# Patient Record
Sex: FEMALE | Race: WHITE | Employment: OTHER | URBAN - NONMETROPOLITAN AREA
[De-identification: names, ages, dates, MRNs, and addresses within clinical notes are randomized per-mention and may not be internally consistent; named-entity substitution may affect disease eponyms.]

---

## 2018-03-16 ENCOUNTER — HOSPITAL ENCOUNTER (OUTPATIENT)
Dept: CT IMAGING | Age: 73
Discharge: HOME | End: 2018-03-16
Payer: MEDICARE

## 2018-03-16 DIAGNOSIS — R10.32 ABDOMINAL PAIN, LEFT LOWER QUADRANT: ICD-10-CM

## 2018-03-16 LAB
CREAT SERPL-MCNC: 1.1 MG/DL (ref 0.5–1.2)
GFR, ESTIMATED: 52 ML/MIN/1.73M2

## 2018-03-16 PROCEDURE — 6360000004 HC RX CONTRAST MEDICATION: Performed by: FAMILY MEDICINE

## 2018-03-16 PROCEDURE — 82565 ASSAY OF CREATININE: CPT

## 2018-03-16 PROCEDURE — 74177 CT ABD & PELVIS W/CONTRAST: CPT

## 2018-03-16 RX ADMIN — IOHEXOL 50 ML: 240 INJECTION, SOLUTION INTRATHECAL; INTRAVASCULAR; INTRAVENOUS; ORAL at 11:08

## 2018-03-16 RX ADMIN — IOPAMIDOL 85 ML: 755 INJECTION, SOLUTION INTRAVENOUS at 11:08

## 2021-01-12 ENCOUNTER — HOSPITAL ENCOUNTER (OUTPATIENT)
Dept: CT IMAGING | Age: 76
Discharge: HOME | End: 2021-01-12
Payer: MEDICARE

## 2021-01-12 DIAGNOSIS — R10.84 ABDOMINAL PAIN, GENERALIZED: ICD-10-CM

## 2021-01-12 LAB
CREAT SERPL-MCNC: 0.9 MG/DL (ref 0.5–1.2)
GFR, ESTIMATED: 65 ML/MIN/1.73M2

## 2021-01-12 PROCEDURE — 74177 CT ABD & PELVIS W/CONTRAST: CPT

## 2021-01-12 PROCEDURE — 82565 ASSAY OF CREATININE: CPT

## 2021-01-12 PROCEDURE — 6360000004 HC RX CONTRAST MEDICATION: Performed by: FAMILY MEDICINE

## 2021-01-12 RX ADMIN — IOPAMIDOL 85 ML: 755 INJECTION, SOLUTION INTRAVENOUS at 12:46

## 2021-01-12 RX ADMIN — IOHEXOL 50 ML: 240 INJECTION, SOLUTION INTRATHECAL; INTRAVASCULAR; INTRAVENOUS; ORAL at 12:46

## 2021-02-18 ENCOUNTER — HOSPITAL ENCOUNTER (OUTPATIENT)
Dept: INTERVENTIONAL RADIOLOGY/VASCULAR | Age: 76
Discharge: HOME | End: 2021-02-18
Payer: MEDICARE

## 2021-02-18 DIAGNOSIS — R22.41 LOCALIZED SWELLING OF RIGHT LOWER LEG: ICD-10-CM

## 2021-02-18 PROCEDURE — 93971 EXTREMITY STUDY: CPT

## 2021-10-26 ENCOUNTER — HOSPITAL ENCOUNTER (OUTPATIENT)
Dept: INTERVENTIONAL RADIOLOGY/VASCULAR | Age: 76
Discharge: HOME | End: 2021-10-26
Payer: MEDICARE

## 2021-10-26 DIAGNOSIS — R22.41 LOCALIZED SWELLING, MASS, OR LUMP OF LOWER EXTREMITY, RIGHT: ICD-10-CM

## 2021-10-26 PROCEDURE — 93971 EXTREMITY STUDY: CPT

## 2025-01-27 ENCOUNTER — HOSPITAL ENCOUNTER (OUTPATIENT)
Dept: GENERAL RADIOLOGY | Age: 80
Discharge: HOME | End: 2025-01-27
Payer: MEDICARE

## 2025-01-27 ENCOUNTER — HOSPITAL ENCOUNTER (OUTPATIENT)
Age: 80
Discharge: HOME | End: 2025-01-27
Payer: MEDICARE

## 2025-01-27 DIAGNOSIS — M25.551 RIGHT HIP PAIN: ICD-10-CM

## 2025-01-27 DIAGNOSIS — M25.561 ARTHRALGIA OF KNEE, RIGHT: ICD-10-CM

## 2025-01-27 PROCEDURE — 73564 X-RAY EXAM KNEE 4 OR MORE: CPT

## 2025-01-27 PROCEDURE — 73502 X-RAY EXAM HIP UNI 2-3 VIEWS: CPT

## 2025-02-05 ENCOUNTER — HOSPITAL ENCOUNTER (OUTPATIENT)
Dept: PHYSICAL THERAPY | Age: 80
Setting detail: THERAPIES SERIES
Discharge: HOME | End: 2025-02-05
Payer: MEDICARE

## 2025-02-05 PROCEDURE — 97110 THERAPEUTIC EXERCISES: CPT

## 2025-02-05 PROCEDURE — 97161 PT EVAL LOW COMPLEX 20 MIN: CPT

## 2025-02-05 NOTE — PROGRESS NOTES
[facility]  PHYSICAL THERAPY  [x] EVALUATION  [] DAILY NOTE (LAND) [] DAILY NOTE (AQUATIC ) [] PROGRESS NOTE [] DISCHARGE NOTE    [] [facility] [place]   [x] [place] [facility]    [] [facility]   [] [facility]    Date: 2025  Patient Name:  Miesha Ambrocio  : 1945  MRN: [id number]  CSN: [id number]    Referring Practitioner Lavelle Tomlin MD [id number]      Diagnosis  Diagnoses       M70.61 (ICD-10-CM) - Trochanteric bursitis, right hip           Treatment Diagnosis M25.551  Right Hip Pain  R53.1 Weakness  M62.81 Generalized Muscle Weakness  R26.2 Difficulty in walking   Date of Evaluation 25   Additional Pertinent History Miesha Ambrocio has a past medical history of Diverticulitis, Hyperlipidemia, Hypertension, and Thyroid disease.  she has a past surgical history that includes Hysterectomy; Appendectomy; shoulder surgery; Bunionectomy; and Dilation and curettage of uterus.     Allergies No Known Allergies   Medications   Current Outpatient Medications:     doxycycline (VIBRA-TABS) 100 MG tablet, Take 1 tablet by mouth 2 times daily., Disp: 20 tablet, Rfl: 0    simvastatin (ZOCOR) 20 MG tablet, Take 20 mg by mouth nightly., Disp: , Rfl:     levocetirizine (XYZAL) 5 MG tablet, Take 5 mg by mouth nightly., Disp: , Rfl:     levothyroxine (SYNTHROID) 50 MCG tablet, Take 50 mcg by mouth Daily., Disp: , Rfl:     losartan (COZAAR) 50 MG tablet, Take 50 mg by mouth daily., Disp: , Rfl:     warfarin (COUMADIN) 2 MG tablet, Take 2 mg by mouth Daily. Take 3 on Sat, Sun, Tues and Thurs and 2.5 on Mon, Wed and Fri., Disp: , Rfl:       Functional Outcome Measure Used HOOS Jr   score 18 (25)       Insurance: Primary: Payor: [employer] /  /  / ,   Secondary:    Authorization Information DEDUCTIBLE: $NA               OUT OF POCKET: $2000 MET $13.93              INSURANCE PAYS AT:  95% of allowable              PATIENT

## 2025-02-10 ENCOUNTER — HOSPITAL ENCOUNTER (OUTPATIENT)
Dept: PHYSICAL THERAPY | Age: 80
Setting detail: THERAPIES SERIES
Discharge: HOME | End: 2025-02-10
Payer: MEDICARE

## 2025-02-10 PROCEDURE — 97035 APP MDLTY 1+ULTRASOUND EA 15: CPT

## 2025-02-10 PROCEDURE — 97110 THERAPEUTIC EXERCISES: CPT

## 2025-02-10 NOTE — PROGRESS NOTES
[facility]  PHYSICAL THERAPY  [] EVALUATION  [x] DAILY NOTE (LAND) [] DAILY NOTE (AQUATIC ) [] PROGRESS NOTE [] DISCHARGE NOTE    [] [facility] [place]   [x] [place] [facility]    [] [facility]   [] [facility]    Date: 2/10/2025  Patient Name:  Miesha Ambrocio  : 1945  MRN: [id number]  CSN: [id number]    Referring Practitioner Lavelle Tomlin MD [id number]      Diagnosis  Diagnoses       M70.61 (ICD-10-CM) - Trochanteric bursitis, right hip           Treatment Diagnosis M25.551  Right Hip Pain  R53.1 Weakness  M62.81 Generalized Muscle Weakness  R26.2 Difficulty in walking   Date of Evaluation 25   Additional Pertinent History Miesha Ambrocio has a past medical history of Diverticulitis, Hyperlipidemia, Hypertension, and Thyroid disease.  she has a past surgical history that includes Hysterectomy; Appendectomy; shoulder surgery; Bunionectomy; and Dilation and curettage of uterus.     Allergies No Known Allergies   Medications   Current Outpatient Medications:     doxycycline (VIBRA-TABS) 100 MG tablet, Take 1 tablet by mouth 2 times daily., Disp: 20 tablet, Rfl: 0    simvastatin (ZOCOR) 20 MG tablet, Take 20 mg by mouth nightly., Disp: , Rfl:     levocetirizine (XYZAL) 5 MG tablet, Take 5 mg by mouth nightly., Disp: , Rfl:     levothyroxine (SYNTHROID) 50 MCG tablet, Take 50 mcg by mouth Daily., Disp: , Rfl:     losartan (COZAAR) 50 MG tablet, Take 50 mg by mouth daily., Disp: , Rfl:     warfarin (COUMADIN) 2 MG tablet, Take 2 mg by mouth Daily. Take 3 on Sat, Sun, Tues and Thurs and 2.5 on Mon, Wed and Fri., Disp: , Rfl:       Functional Outcome Measure Used HOOS Jr   score 18 (25)       Insurance: Primary: Payor: [employer] /  /  / ,   Secondary:    Authorization Information DEDUCTIBLE: $NA               OUT OF POCKET: $2000 MET $13.93              INSURANCE PAYS AT:  95% of allowable              PATIENT

## 2025-02-12 ENCOUNTER — HOSPITAL ENCOUNTER (OUTPATIENT)
Dept: PHYSICAL THERAPY | Age: 80
Setting detail: THERAPIES SERIES
Discharge: HOME | End: 2025-02-12
Payer: MEDICARE

## 2025-02-12 PROCEDURE — 97035 APP MDLTY 1+ULTRASOUND EA 15: CPT

## 2025-02-12 PROCEDURE — 97110 THERAPEUTIC EXERCISES: CPT

## 2025-02-12 NOTE — PROGRESS NOTES
[facility]  PHYSICAL THERAPY  [] EVALUATION  [x] DAILY NOTE (LAND) [] DAILY NOTE (AQUATIC ) [] PROGRESS NOTE [] DISCHARGE NOTE    [] [facility] [place]   [x] [place] [facility]    [] [facility]   [] [facility]    Date: 2025  Patient Name:  Miesha Ambrocio  : 1945  MRN: [id number]  CSN: [id number]    Referring Practitioner Lavelle Tomlin MD [id number]      Diagnosis  Diagnoses       M70.61 (ICD-10-CM) - Trochanteric bursitis, right hip           Treatment Diagnosis M25.551  Right Hip Pain  R53.1 Weakness  M62.81 Generalized Muscle Weakness  R26.2 Difficulty in walking   Date of Evaluation 25   Additional Pertinent History Miesha Ambrocio has a past medical history of Diverticulitis, Hyperlipidemia, Hypertension, and Thyroid disease.  she has a past surgical history that includes Hysterectomy; Appendectomy; shoulder surgery; Bunionectomy; and Dilation and curettage of uterus.     Allergies No Known Allergies   Medications   Current Outpatient Medications:     doxycycline (VIBRA-TABS) 100 MG tablet, Take 1 tablet by mouth 2 times daily., Disp: 20 tablet, Rfl: 0    simvastatin (ZOCOR) 20 MG tablet, Take 20 mg by mouth nightly., Disp: , Rfl:     levocetirizine (XYZAL) 5 MG tablet, Take 5 mg by mouth nightly., Disp: , Rfl:     levothyroxine (SYNTHROID) 50 MCG tablet, Take 50 mcg by mouth Daily., Disp: , Rfl:     losartan (COZAAR) 50 MG tablet, Take 50 mg by mouth daily., Disp: , Rfl:     warfarin (COUMADIN) 2 MG tablet, Take 2 mg by mouth Daily. Take 3 on Sat, Sun, Tues and Thurs and 2.5 on Mon, Wed and Fri., Disp: , Rfl:       Functional Outcome Measure Used HOOS Jr   score 18 (25)       Insurance: Primary: Payor: [employer] /  /  / ,   Secondary:    Authorization Information DEDUCTIBLE: $NA               OUT OF POCKET: $2000 MET $13.93              INSURANCE PAYS AT:  95% of allowable              PATIENT

## 2025-02-19 ENCOUNTER — HOSPITAL ENCOUNTER (OUTPATIENT)
Dept: PHYSICAL THERAPY | Age: 80
Setting detail: THERAPIES SERIES
Discharge: HOME | End: 2025-02-19
Payer: MEDICARE

## 2025-02-19 PROCEDURE — 97035 APP MDLTY 1+ULTRASOUND EA 15: CPT

## 2025-02-19 PROCEDURE — 97110 THERAPEUTIC EXERCISES: CPT

## 2025-02-19 NOTE — PROGRESS NOTES
under knees 15*x5 sec  x    Hip adduction ball squeeze 15*x5 sec  x    Bent knee fall out 10x5 sec  x    March 10  x    Bridge 3-4x3 sec   Caused hamstring cramps          LTR 10x5 sec  x    Piriformis stretch IR and ER pushing down on knee R 4x15 sec ea  x    Hamstring 90/90 stretch R 4x15 sec  x           LAQ in chair 10x 5 seconds                                                                Specific Interventions Next Treatment: phonophoresis 1 MHz, 50% pulsed, 8 minutes R lateral hip, gentle AROM, stretching, strengthening avoid increased R hip pain, progress to sitting, standing, balance, step up , NuStep as pain lessens    Activity/Treatment Tolerance:  []  Patient tolerated treatment well  []  Patient limited by fatigue  [x]  Patient limited by pain   []  Patient limited by medical complications  []  Other:     Assessment:  Gentle progressions today due to patient being a little sore from activity early. Patient reporting no complains at end of session.    GOALS:  Patient Goal: to get the pain to go away    Short Term Goals: deferred to LTG's    Long Term Goals: 6 weeks   Increase AROM R hip flexion to 70, abduction to 15, knee flexion to 120 degrees to allow sit to stand , in/out of car with decreased R hip pain 2/10  Increase strength R LE hip to 3+/5, knee 4-/5 to allow patient to report able to walk 30 minutes with decreased R hip pain 2/10  I with HEP as prescribed to allow patient to sleep through night without awakening due to R hip pain      Patient Education:   [x]  HEP/Education Completed:  [employer] Access Code: [email or web address]   []  No new Education completed  [x]  Reviewed Prior HEP      [x]  Patient verbalized and/or demonstrated understanding of education provided.  []  Patient unable to verbalize and/or demonstrate understanding of education provided.  Will continue education.  []  Barriers to learning:     PLAN:  Treatment Recommendations: Strengthening, Range of Motion, Balance Training, Gait

## 2025-02-21 ENCOUNTER — HOSPITAL ENCOUNTER (OUTPATIENT)
Dept: PHYSICAL THERAPY | Age: 80
Setting detail: THERAPIES SERIES
Discharge: HOME | End: 2025-02-21
Payer: MEDICARE

## 2025-02-21 PROCEDURE — 97110 THERAPEUTIC EXERCISES: CPT

## 2025-02-21 NOTE — PROGRESS NOTES
[facility]  PHYSICAL THERAPY  [] EVALUATION  [x] DAILY NOTE (LAND) [] DAILY NOTE (AQUATIC ) [] PROGRESS NOTE [] DISCHARGE NOTE    [] [facility] [place]   [x] [place] [facility]    [] [facility]   [] [facility]    Date: 2025  Patient Name:  Miesha Ambrocio  : 1945  MRN: [id number]  CSN: [id number]    Referring Practitioner Lavelle Tomlin MD [id number]      Diagnosis  Diagnoses       M70.61 (ICD-10-CM) - Trochanteric bursitis, right hip           Treatment Diagnosis M25.551  Right Hip Pain  R53.1 Weakness  M62.81 Generalized Muscle Weakness  R26.2 Difficulty in walking   Date of Evaluation 25   Additional Pertinent History Miesha Ambrocio has a past medical history of Diverticulitis, Hyperlipidemia, Hypertension, and Thyroid disease.  she has a past surgical history that includes Hysterectomy; Appendectomy; shoulder surgery; Bunionectomy; and Dilation and curettage of uterus.     Allergies No Known Allergies   Medications   Current Outpatient Medications:     doxycycline (VIBRA-TABS) 100 MG tablet, Take 1 tablet by mouth 2 times daily., Disp: 20 tablet, Rfl: 0    simvastatin (ZOCOR) 20 MG tablet, Take 20 mg by mouth nightly., Disp: , Rfl:     levocetirizine (XYZAL) 5 MG tablet, Take 5 mg by mouth nightly., Disp: , Rfl:     levothyroxine (SYNTHROID) 50 MCG tablet, Take 50 mcg by mouth Daily., Disp: , Rfl:     losartan (COZAAR) 50 MG tablet, Take 50 mg by mouth daily., Disp: , Rfl:     warfarin (COUMADIN) 2 MG tablet, Take 2 mg by mouth Daily. Take 3 on Sat, Sun, Tues and Thurs and 2.5 on Mon, Wed and Fri., Disp: , Rfl:       Functional Outcome Measure Used HOOS Jr   score 18 (25)       Insurance: Primary: Payor: [employer] /  /  / ,   Secondary:    Authorization Information DEDUCTIBLE: $NA               OUT OF POCKET: $2000 MET $13.93              INSURANCE PAYS AT:  95% of allowable              PATIENT

## 2025-02-24 ENCOUNTER — HOSPITAL ENCOUNTER (OUTPATIENT)
Dept: PHYSICAL THERAPY | Age: 80
Setting detail: THERAPIES SERIES
Discharge: HOME | End: 2025-02-24
Payer: MEDICARE

## 2025-02-24 PROCEDURE — 97110 THERAPEUTIC EXERCISES: CPT

## 2025-02-24 PROCEDURE — 97035 APP MDLTY 1+ULTRASOUND EA 15: CPT

## 2025-02-24 NOTE — PROGRESS NOTES
[facility]  PHYSICAL THERAPY  [] EVALUATION  [x] DAILY NOTE (LAND) [] DAILY NOTE (AQUATIC ) [] PROGRESS NOTE [] DISCHARGE NOTE    [] [facility] [place]   [x] [place] [facility]    [] [facility]   [] [facility]    Date: 2025  Patient Name:  Miesha Ambrocio  : 1945  MRN: [id number]  CSN: [id number]    Referring Practitioner Lavelle Tomlin MD [id number]      Diagnosis  Diagnoses       M70.61 (ICD-10-CM) - Trochanteric bursitis, right hip           Treatment Diagnosis M25.551  Right Hip Pain  R53.1 Weakness  M62.81 Generalized Muscle Weakness  R26.2 Difficulty in walking   Date of Evaluation 25   Additional Pertinent History Miesha Ambrocio has a past medical history of Diverticulitis, Hyperlipidemia, Hypertension, and Thyroid disease.  she has a past surgical history that includes Hysterectomy; Appendectomy; shoulder surgery; Bunionectomy; and Dilation and curettage of uterus.     Allergies No Known Allergies   Medications   Current Outpatient Medications:     doxycycline (VIBRA-TABS) 100 MG tablet, Take 1 tablet by mouth 2 times daily., Disp: 20 tablet, Rfl: 0    simvastatin (ZOCOR) 20 MG tablet, Take 20 mg by mouth nightly., Disp: , Rfl:     levocetirizine (XYZAL) 5 MG tablet, Take 5 mg by mouth nightly., Disp: , Rfl:     levothyroxine (SYNTHROID) 50 MCG tablet, Take 50 mcg by mouth Daily., Disp: , Rfl:     losartan (COZAAR) 50 MG tablet, Take 50 mg by mouth daily., Disp: , Rfl:     warfarin (COUMADIN) 2 MG tablet, Take 2 mg by mouth Daily. Take 3 on Sat, Sun, Tues and Thurs and 2.5 on Mon, Wed and Fri., Disp: , Rfl:       Functional Outcome Measure Used HOOS Jr   score 18 (25)       Insurance: Primary: Payor: [employer] /  /  / ,   Secondary:    Authorization Information DEDUCTIBLE: $NA               OUT OF POCKET: $2000 MET $13.93              INSURANCE PAYS AT:  95% of allowable              PATIENT

## 2025-02-27 ENCOUNTER — HOSPITAL ENCOUNTER (OUTPATIENT)
Dept: PHYSICAL THERAPY | Age: 80
Setting detail: THERAPIES SERIES
Discharge: HOME | End: 2025-02-27
Payer: MEDICARE

## 2025-02-27 PROCEDURE — 97035 APP MDLTY 1+ULTRASOUND EA 15: CPT

## 2025-02-27 PROCEDURE — 97110 THERAPEUTIC EXERCISES: CPT

## 2025-02-27 NOTE — PROGRESS NOTES
[facility]  PHYSICAL THERAPY  [] EVALUATION  [x] DAILY NOTE (LAND) [] DAILY NOTE (AQUATIC ) [] PROGRESS NOTE [] DISCHARGE NOTE    [] [facility] [place]   [x] [place] [facility]    [] [facility]   [] [facility]    Date: 2025  Patient Name:  Miesha Ambrocio  : 1945  MRN: [id number]  CSN: [id number]    Referring Practitioner Lavelle Tomlin MD [id number]      Diagnosis  Diagnoses       M70.61 (ICD-10-CM) - Trochanteric bursitis, right hip           Treatment Diagnosis M25.551  Right Hip Pain  R53.1 Weakness  M62.81 Generalized Muscle Weakness  R26.2 Difficulty in walking   Date of Evaluation 25   Additional Pertinent History Miesha Ambrocio has a past medical history of Diverticulitis, Hyperlipidemia, Hypertension, and Thyroid disease.  she has a past surgical history that includes Hysterectomy; Appendectomy; shoulder surgery; Bunionectomy; and Dilation and curettage of uterus.     Allergies No Known Allergies   Medications   Current Outpatient Medications:     doxycycline (VIBRA-TABS) 100 MG tablet, Take 1 tablet by mouth 2 times daily., Disp: 20 tablet, Rfl: 0    simvastatin (ZOCOR) 20 MG tablet, Take 20 mg by mouth nightly., Disp: , Rfl:     levocetirizine (XYZAL) 5 MG tablet, Take 5 mg by mouth nightly., Disp: , Rfl:     levothyroxine (SYNTHROID) 50 MCG tablet, Take 50 mcg by mouth Daily., Disp: , Rfl:     losartan (COZAAR) 50 MG tablet, Take 50 mg by mouth daily., Disp: , Rfl:     warfarin (COUMADIN) 2 MG tablet, Take 2 mg by mouth Daily. Take 3 on Sat, Sun, Tues and Thurs and 2.5 on Mon, Wed and Fri., Disp: , Rfl:       Functional Outcome Measure Used HOOS Jr   score 18 (25)       Insurance: Primary: Payor: [employer] /  /  / ,   Secondary:    Authorization Information DEDUCTIBLE: $NA               OUT OF POCKET: $2000 MET $13.93              INSURANCE PAYS AT:  95% of allowable              PATIENT

## 2025-03-03 ENCOUNTER — HOSPITAL ENCOUNTER (OUTPATIENT)
Dept: PHYSICAL THERAPY | Age: 80
Setting detail: THERAPIES SERIES
Discharge: HOME | End: 2025-03-03
Payer: MEDICARE

## 2025-03-03 PROCEDURE — 97110 THERAPEUTIC EXERCISES: CPT

## 2025-03-03 PROCEDURE — 97035 APP MDLTY 1+ULTRASOUND EA 15: CPT

## 2025-03-03 NOTE — PROGRESS NOTES
[facility]  PHYSICAL THERAPY  [] EVALUATION  [x] DAILY NOTE (LAND) [] DAILY NOTE (AQUATIC ) [] PROGRESS NOTE [] DISCHARGE NOTE    [] [facility] [place]   [x] [place] [facility]    [] [facility]   [] [facility]    Date: 3/3/2025  Patient Name:  Miesha Ambrocio  : 1945  MRN: [id number]  CSN: [id number]    Referring Practitioner Lavelle Tomlin MD [id number]      Diagnosis  Diagnoses       M70.61 (ICD-10-CM) - Trochanteric bursitis, right hip           Treatment Diagnosis M25.551  Right Hip Pain  R53.1 Weakness  M62.81 Generalized Muscle Weakness  R26.2 Difficulty in walking   Date of Evaluation 25   Additional Pertinent History Miesha Ambrocio has a past medical history of Diverticulitis, Hyperlipidemia, Hypertension, and Thyroid disease.  she has a past surgical history that includes Hysterectomy; Appendectomy; shoulder surgery; Bunionectomy; and Dilation and curettage of uterus.     Allergies No Known Allergies   Medications   Current Outpatient Medications:     doxycycline (VIBRA-TABS) 100 MG tablet, Take 1 tablet by mouth 2 times daily., Disp: 20 tablet, Rfl: 0    simvastatin (ZOCOR) 20 MG tablet, Take 20 mg by mouth nightly., Disp: , Rfl:     levocetirizine (XYZAL) 5 MG tablet, Take 5 mg by mouth nightly., Disp: , Rfl:     levothyroxine (SYNTHROID) 50 MCG tablet, Take 50 mcg by mouth Daily., Disp: , Rfl:     losartan (COZAAR) 50 MG tablet, Take 50 mg by mouth daily., Disp: , Rfl:     warfarin (COUMADIN) 2 MG tablet, Take 2 mg by mouth Daily. Take 3 on Sat, Sun, Tues and Thurs and 2.5 on Mon, Wed and Fri., Disp: , Rfl:       Functional Outcome Measure Used HOOS Jr   score 18 (25)       Insurance: Primary: Payor: [employer] /  /  / ,   Secondary:    Authorization Information DEDUCTIBLE: $NA               OUT OF POCKET: $2000 MET $13.93              INSURANCE PAYS AT:  95% of allowable              PATIENT

## 2025-03-06 ENCOUNTER — HOSPITAL ENCOUNTER (OUTPATIENT)
Dept: PHYSICAL THERAPY | Age: 80
Setting detail: THERAPIES SERIES
Discharge: HOME | End: 2025-03-06
Payer: MEDICARE

## 2025-03-06 PROCEDURE — 97035 APP MDLTY 1+ULTRASOUND EA 15: CPT

## 2025-03-06 PROCEDURE — 97110 THERAPEUTIC EXERCISES: CPT

## 2025-03-06 NOTE — PROGRESS NOTES
[facility]  PHYSICAL THERAPY  [] EVALUATION  [x] DAILY NOTE (LAND) [] DAILY NOTE (AQUATIC ) [] PROGRESS NOTE [] DISCHARGE NOTE    [] [facility] [place]   [x] [place] [facility]    [] [facility]   [] [facility]    Date: 3/6/2025  Patient Name:  Miesha Ambrocio  : 1945  MRN: [id number]  [facility]: [id number]    Referring Practitioner Lavelle Tomlin MD [id number]      Diagnosis  Diagnoses       M70.61 (ICD-10-CM) - Trochanteric bursitis, right hip           Treatment Diagnosis M25.551  Right Hip Pain  R53.1 Weakness  M62.81 Generalized Muscle Weakness  R26.2 Difficulty in walking   Date of Evaluation 25   Additional Pertinent History Miesha Ambrocio has a past medical history of Diverticulitis, Hyperlipidemia, Hypertension, and Thyroid disease.  she has a past surgical history that includes Hysterectomy; Appendectomy; shoulder surgery; Bunionectomy; and Dilation and curettage of uterus.     Allergies No Known Allergies   Medications   Current Outpatient Medications:     doxycycline (VIBRA-TABS) 100 MG tablet, Take 1 tablet by mouth 2 times daily., Disp: 20 tablet, Rfl: 0    simvastatin (ZOCOR) 20 MG tablet, Take 20 mg by mouth nightly., Disp: , Rfl:     levocetirizine (XYZAL) 5 MG tablet, Take 5 mg by mouth nightly., Disp: , Rfl:     levothyroxine (SYNTHROID) 50 MCG tablet, Take 50 mcg by mouth Daily., Disp: , Rfl:     losartan (COZAAR) 50 MG tablet, Take 50 mg by mouth daily., Disp: , Rfl:     warfarin (COUMADIN) 2 MG tablet, Take 2 mg by mouth Daily. Take 3 on Sat, Sun, Tues and Thurs and 2.5 on Mon, Wed and Fri., Disp: , Rfl:       Functional Outcome Measure Used HOOS Jr   score 18 (25)       Insurance: Primary: Payor: [employer] /  /  / ,   Secondary:    Authorization Information DEDUCTIBLE: $NA               OUT OF POCKET: $2000 MET $13.93              INSURANCE PAYS AT:  95% of allowable              PATIENT

## 2025-03-10 ENCOUNTER — HOSPITAL ENCOUNTER (OUTPATIENT)
Dept: PHYSICAL THERAPY | Age: 80
Setting detail: THERAPIES SERIES
Discharge: HOME | End: 2025-03-10
Payer: MEDICARE

## 2025-03-10 PROCEDURE — 97035 APP MDLTY 1+ULTRASOUND EA 15: CPT

## 2025-03-10 PROCEDURE — 97110 THERAPEUTIC EXERCISES: CPT

## 2025-03-10 NOTE — PROGRESS NOTES
[facility]  PHYSICAL THERAPY  [] EVALUATION  [x] DAILY NOTE (LAND) [] DAILY NOTE (AQUATIC ) [] PROGRESS NOTE [] DISCHARGE NOTE    [] [facility] [place]   [x] [place] [facility]    [] [facility]   [] [facility]    Date: 3/10/2025  Patient Name:  Miesha Ambrocio  : 1945  MRN: [id number]  CSN: [id number]    Referring Practitioner Lavelle Tomlin MD [id number]      Diagnosis  Diagnoses       M70.61 (ICD-10-CM) - Trochanteric bursitis, right hip           Treatment Diagnosis M25.551  Right Hip Pain  R53.1 Weakness  M62.81 Generalized Muscle Weakness  R26.2 Difficulty in walking   Date of Evaluation 25   Additional Pertinent History Miesha Ambrocio has a past medical history of Diverticulitis, Hyperlipidemia, Hypertension, and Thyroid disease.  she has a past surgical history that includes Hysterectomy; Appendectomy; shoulder surgery; Bunionectomy; and Dilation and curettage of uterus.     Allergies No Known Allergies   Medications   Current Outpatient Medications:     doxycycline (VIBRA-TABS) 100 MG tablet, Take 1 tablet by mouth 2 times daily., Disp: 20 tablet, Rfl: 0    simvastatin (ZOCOR) 20 MG tablet, Take 20 mg by mouth nightly., Disp: , Rfl:     levocetirizine (XYZAL) 5 MG tablet, Take 5 mg by mouth nightly., Disp: , Rfl:     levothyroxine (SYNTHROID) 50 MCG tablet, Take 50 mcg by mouth Daily., Disp: , Rfl:     losartan (COZAAR) 50 MG tablet, Take 50 mg by mouth daily., Disp: , Rfl:     warfarin (COUMADIN) 2 MG tablet, Take 2 mg by mouth Daily. Take 3 on Sat, Sun, Tues and Thurs and 2.5 on Mon, Wed and Fri., Disp: , Rfl:       Functional Outcome Measure Used HOOS Jr   score 18 (25)       Insurance: Primary: Payor: [employer] /  /  / ,   Secondary:    Authorization Information DEDUCTIBLE: $NA               OUT OF POCKET: $2000 MET $13.93              INSURANCE PAYS AT:  95% of allowable              PATIENT

## 2025-03-13 ENCOUNTER — HOSPITAL ENCOUNTER (OUTPATIENT)
Dept: PHYSICAL THERAPY | Age: 80
Setting detail: THERAPIES SERIES
End: 2025-03-13
Payer: MEDICARE

## 2025-03-14 ENCOUNTER — HOSPITAL ENCOUNTER (OUTPATIENT)
Dept: PHYSICAL THERAPY | Age: 80
Setting detail: THERAPIES SERIES
Discharge: HOME | End: 2025-03-14
Payer: MEDICARE

## 2025-03-14 ASSESSMENT — HOOS JR
RISING FROM SITTING: MODERATE
WALKING ON UNEVEN SURFACE: MODERATE
HOOS JR TOTAL INTERVAL SCORE: 55.985
LYING IN BED (TURNING OVER, MAINTAINING HIP POSITION): SEVERE
BENDING TO THE FLOOR TO PICK UP OBJECT: MODERATE
HOOS JR RAW SCORE: 11
GOING UP OR DOWN STAIRS: MODERATE
HOOS JR RAW SCORE: 11

## 2025-03-20 ENCOUNTER — TRANSCRIBE ORDERS (OUTPATIENT)
Dept: ADMINISTRATIVE | Age: 80
End: 2025-03-20

## 2025-03-20 DIAGNOSIS — R10.32 ABDOMINAL PAIN, LEFT LOWER QUADRANT: Primary | ICD-10-CM

## 2025-03-21 ENCOUNTER — HOSPITAL ENCOUNTER (OUTPATIENT)
Dept: CT IMAGING | Age: 80
Discharge: HOME | End: 2025-03-21
Attending: FAMILY MEDICINE
Payer: MEDICARE

## 2025-03-21 DIAGNOSIS — R10.32 ABDOMINAL PAIN, LEFT LOWER QUADRANT: ICD-10-CM

## 2025-03-21 PROCEDURE — 74177 CT ABD & PELVIS W/CONTRAST: CPT

## 2025-03-21 PROCEDURE — 6360000004 HC RX CONTRAST MEDICATION: Performed by: FAMILY MEDICINE

## 2025-03-21 RX ORDER — IOPAMIDOL 755 MG/ML
85 INJECTION, SOLUTION INTRAVASCULAR
Status: COMPLETED | OUTPATIENT
Start: 2025-03-21 | End: 2025-03-21

## 2025-03-21 RX ADMIN — IOPAMIDOL 85 ML: 755 INJECTION, SOLUTION INTRAVENOUS at 14:06
